# Patient Record
Sex: MALE | Race: WHITE | NOT HISPANIC OR LATINO | URBAN - METROPOLITAN AREA
[De-identification: names, ages, dates, MRNs, and addresses within clinical notes are randomized per-mention and may not be internally consistent; named-entity substitution may affect disease eponyms.]

---

## 2020-01-24 ENCOUNTER — WALK IN (OUTPATIENT)
Dept: URGENT CARE | Age: 35
End: 2020-01-24

## 2020-01-24 DIAGNOSIS — J01.90 ACUTE BACTERIAL SINUSITIS: Primary | ICD-10-CM

## 2020-01-24 DIAGNOSIS — J06.9 UPPER RESPIRATORY TRACT INFECTION, UNSPECIFIED TYPE: ICD-10-CM

## 2020-01-24 DIAGNOSIS — B96.89 ACUTE BACTERIAL SINUSITIS: Primary | ICD-10-CM

## 2020-01-24 PROCEDURE — 99204 OFFICE O/P NEW MOD 45 MIN: CPT | Performed by: NURSE PRACTITIONER

## 2020-01-24 RX ORDER — GUAIFENESIN AND DEXTROMETHORPHAN HYDROBROMIDE 1200; 60 MG/1; MG/1
1 TABLET, EXTENDED RELEASE ORAL EVERY 12 HOURS
Qty: 28 TABLET | Refills: 0 | Status: SHIPPED | OUTPATIENT
Start: 2020-01-24

## 2020-01-24 RX ORDER — AMOXICILLIN AND CLAVULANATE POTASSIUM 875; 125 MG/1; MG/1
1 TABLET, FILM COATED ORAL EVERY 12 HOURS
Qty: 20 TABLET | Refills: 0 | Status: SHIPPED | OUTPATIENT
Start: 2020-01-24

## 2020-01-24 ASSESSMENT — ENCOUNTER SYMPTOMS
PSYCHIATRIC NEGATIVE: 1
COUGH: 1
NEUROLOGICAL NEGATIVE: 1
GASTROINTESTINAL NEGATIVE: 1
CONSTITUTIONAL NEGATIVE: 1
EYES NEGATIVE: 1

## 2020-01-24 ASSESSMENT — PAIN SCALES - GENERAL: PAINLEVEL: 0

## 2021-05-26 VITALS
TEMPERATURE: 98.1 F | RESPIRATION RATE: 18 BRPM | DIASTOLIC BLOOD PRESSURE: 80 MMHG | BODY MASS INDEX: 29.82 KG/M2 | SYSTOLIC BLOOD PRESSURE: 135 MMHG | HEIGHT: 67 IN | WEIGHT: 190 LBS | HEART RATE: 76 BPM | OXYGEN SATURATION: 96 %

## 2024-07-16 ENCOUNTER — OFFICE VISIT (OUTPATIENT)
Facility: CLINIC | Age: 39
End: 2024-07-16
Payer: COMMERCIAL

## 2024-07-16 VITALS
HEIGHT: 67 IN | HEART RATE: 69 BPM | SYSTOLIC BLOOD PRESSURE: 140 MMHG | DIASTOLIC BLOOD PRESSURE: 86 MMHG | BODY MASS INDEX: 29.57 KG/M2 | WEIGHT: 188.38 LBS

## 2024-07-16 DIAGNOSIS — L60.9 NAIL DISORDER: ICD-10-CM

## 2024-07-16 DIAGNOSIS — Z00.01 ENCOUNTER FOR GENERAL ADULT MEDICAL EXAMINATION WITH ABNORMAL FINDINGS: Primary | ICD-10-CM

## 2024-07-16 DIAGNOSIS — R53.83 OTHER FATIGUE: ICD-10-CM

## 2024-07-16 DIAGNOSIS — R68.82 DECREASED LIBIDO: ICD-10-CM

## 2024-07-16 PROCEDURE — 3079F DIAST BP 80-89 MM HG: CPT | Performed by: FAMILY MEDICINE

## 2024-07-16 PROCEDURE — 99385 PREV VISIT NEW AGE 18-39: CPT | Performed by: FAMILY MEDICINE

## 2024-07-16 PROCEDURE — 3077F SYST BP >= 140 MM HG: CPT | Performed by: FAMILY MEDICINE

## 2024-07-16 PROCEDURE — 99203 OFFICE O/P NEW LOW 30 MIN: CPT | Performed by: FAMILY MEDICINE

## 2024-07-16 PROCEDURE — 3008F BODY MASS INDEX DOCD: CPT | Performed by: FAMILY MEDICINE

## 2024-07-16 NOTE — PROGRESS NOTES
Subjective:   Gordo Grewal is a 39 year old male who presents for Establish Care (Patient is here at the office to establish care. ) and Physical (Patient is requesting annual physical exam. Patient wants to referral to see a podiatrist because he has a nail problem on right big toe.)     Patient presents to [facility] [place] care and is interested in more information on TRT. Has not changed anything in lifestyle. Very active. Runs. Plays golf. Is endorsing fatigue, weight gain (20 lbs), all this within the past year. This is taking a toll on mental health. Patient is noting less sexual desire and less frequent spontaneous erections.     Right foot nail avulsion  Great toe nail fell off after marathon last year. Would like referral to podiatrist.    History/Other:    Chief Complaint Reviewed and Verified  Nursing Notes Reviewed and   Verified  Tobacco Reviewed  Allergies Reviewed  Medications Reviewed    Medical History Reviewed  Surgical History Reviewed  Family History   Reviewed  Social History Reviewed         Tobacco:  He has never smoked tobacco.    No current outpatient medications on file.         Review of Systems:  Review of Systems   Constitutional:  Positive for fatigue.   HENT: Negative.     Eyes: Negative.    Respiratory: Negative.     Cardiovascular: Negative.    Gastrointestinal: Negative.    Genitourinary:         +decreased libido   Musculoskeletal: Negative.    Skin:         +right great toe nail change   Neurological: Negative.    Psychiatric/Behavioral: Negative.           Objective:   /86 (BP Location: Left arm, Patient Position: Sitting, Cuff Size: large)   Pulse 69   Ht 5' 7\" (1.702 m)   Wt 188 lb 6 oz (85.4 kg)   BMI 29.50 kg/m²  Estimated body mass index is 29.5 kg/m² as calculated from the following:    Height as of this encounter: 5' 7\" (1.702 m).    Weight as of this encounter: 188 lb 6 oz (85.4 kg).  Physical Exam  Vitals and nursing note reviewed.   Constitutional:        General: He is not in acute distress.     Appearance: Normal appearance.   HENT:      Head: Normocephalic and atraumatic.   Eyes:      General:         Right eye: No discharge.         Left eye: No discharge.      Comments: Extraocular eye movements grossly intact   Cardiovascular:      Rate and Rhythm: Normal rate and regular rhythm.      Pulses: Normal pulses.      Heart sounds: Normal heart sounds. No murmur heard.     No friction rub. No gallop.   Pulmonary:      Effort: Pulmonary effort is normal. No respiratory distress.      Breath sounds: Normal breath sounds. No stridor. No wheezing or rhonchi.   Musculoskeletal:      Comments: Normal gait   Skin:     Findings: No rash.   Neurological:      General: No focal deficit present.      Mental Status: He is alert. Mental status is at baseline.   Psychiatric:         Mood and Affect: Mood normal.         Behavior: Behavior normal.         Assessment & Plan:   1. Encounter for general adult medical examination with abnormal findings (Primary)  -     CBC With Differential With Platelet  -     Comp Metabolic Panel (14)  -     Lipid Panel  -     Hemoglobin A1C  -     TSH W Reflex To Free T4  -     Testosterone Total    Placed orders so patient may get annual [facility] at [facility].     2. Other fatigue  4. Decreased libido  -     Testosterone Total    -ordered labs as above to ensure no anemia, thyroid, or other metabolic disorder contributing to symptoms.   -did explain that testosterone is checked 2 separate times before decision to treat is made. Counseled patient to get testosterone drawn before 10 a.m. and fasted    3. Nail disorder  -     Podiatry Referral    Placed referral to Dr. Curry given patient location     Return pending testosterone results.    Concepcion Camacho MD, 7/16/2024, 11:06 AM

## 2024-07-30 ENCOUNTER — TELEPHONE (OUTPATIENT)
Facility: CLINIC | Age: 39
End: 2024-07-30

## 2024-07-30 DIAGNOSIS — R79.89 LOW TESTOSTERONE IN MALE: Primary | ICD-10-CM

## 2024-07-30 NOTE — TELEPHONE ENCOUNTER
Testosterone low. Will repeat once more. Sent patient [employer]    Concepcion Camacho MD, 07/30/24, 8:40 AM

## 2024-08-06 LAB — TESTOSTERONE,TOTAL,MALES: 82 NG/DL (ref 250–827)

## 2024-08-26 ENCOUNTER — OFFICE VISIT (OUTPATIENT)
Facility: CLINIC | Age: 39
End: 2024-08-26
Payer: COMMERCIAL

## 2024-08-26 ENCOUNTER — TELEPHONE (OUTPATIENT)
Facility: CLINIC | Age: 39
End: 2024-08-26

## 2024-08-26 VITALS
HEART RATE: 97 BPM | DIASTOLIC BLOOD PRESSURE: 60 MMHG | WEIGHT: 193.25 LBS | SYSTOLIC BLOOD PRESSURE: 124 MMHG | BODY MASS INDEX: 30 KG/M2 | OXYGEN SATURATION: 99 %

## 2024-08-26 DIAGNOSIS — R79.89 LOW TESTOSTERONE IN MALE: Primary | ICD-10-CM

## 2024-08-26 PROCEDURE — 3074F SYST BP LT 130 MM HG: CPT | Performed by: FAMILY MEDICINE

## 2024-08-26 PROCEDURE — 3078F DIAST BP <80 MM HG: CPT | Performed by: FAMILY MEDICINE

## 2024-08-26 PROCEDURE — 99213 OFFICE O/P EST LOW 20 MIN: CPT | Performed by: FAMILY MEDICINE

## 2024-08-26 RX ORDER — TESTOSTERONE 20.25 MG/1.25G
40.5 GEL TOPICAL EVERY MORNING
Qty: 75 G | Refills: 3 | Status: SHIPPED | OUTPATIENT
Start: 2024-08-26

## 2024-08-26 NOTE — PROGRESS NOTES
Subjective:   Gordo Grewal is a 39 year old male who presents for Hypertension (Patient is at the office to follow up on blood pressure. )     Patient presents today to discuss testosterone replacement initiation. Did have low testosterone confirmed on 2 separate measurements. At previous visit patient was endorsing fatigue, weight gain (20 lbs), all this within the past year. This is taking a toll on mental health. Patient noting less sexual desire and less frequent spontaneous erections.        History/Other:    Chief Complaint Reviewed and Verified  Nursing Notes Reviewed and   Verified  Tobacco Reviewed  Allergies Reviewed  Medications Reviewed    Problem List Reviewed  Medical History Reviewed  Surgical History   Reviewed  Family History Reviewed  Social History Reviewed         Tobacco:  He has never smoked tobacco.    Current Outpatient Medications   Medication Sig Dispense Refill    Testosterone 1.62 % Transdermal Gel Place 40.5 mg onto the skin every morning. Apply to bilateral shoulders and upper arms 75 g 3         Review of Systems:  Review of Systems   Constitutional:  Positive for fatigue and unexpected weight change (weight gain).   HENT: Negative.     Eyes: Negative.    Respiratory: Negative.     Cardiovascular: Negative.    Gastrointestinal: Negative.    Genitourinary: Negative.    Musculoskeletal: Negative.    Skin: Negative.    Neurological: Negative.    Psychiatric/Behavioral: Negative.           Objective:   /60 (BP Location: Left arm, Patient Position: Sitting, Cuff Size: adult)   Pulse 97   Wt 193 lb 4 oz (87.7 kg)   SpO2 99%   BMI 30.27 kg/m²  Estimated body mass index is 30.27 kg/m² as calculated from the following:    Height as of 7/16/24: 5' 7\" (1.702 m).    Weight as of this encounter: 193 lb 4 oz (87.7 kg).  Physical Exam  Vitals and nursing note reviewed.   Constitutional:       General: He is not in acute distress.     Appearance: Normal appearance.   HENT:       Head: Normocephalic and atraumatic.   Eyes:      General:         Right eye: No discharge.         Left eye: No discharge.      Comments: Extraocular eye movements grossly intact   Pulmonary:      Effort: Pulmonary effort is normal.   Musculoskeletal:      Comments: Normal gait   Skin:     Findings: No rash.   Neurological:      General: No focal deficit present.      Mental Status: He is alert. Mental status is at baseline.   Psychiatric:         Mood and Affect: Mood normal.         Behavior: Behavior normal.           Assessment & Plan:   1. Low testosterone in male (Primary)  -     Testosterone; Place 40.5 mg onto the skin every morning. Apply to bilateral shoulders and upper arms  Dispense: 75 g; Refill: 3  The patient educated on disease process, medication use and side effects, and when to have PCP follow up.       Return in about 2-3 months (around 10/26/2024) for For testosterone follow up.    Concepcion Camacho MD, 8/26/2024, 8:48 AM

## 2024-08-27 DIAGNOSIS — R79.89 LOW TESTOSTERONE IN MALE: ICD-10-CM

## 2024-08-27 RX ORDER — TESTOSTERONE 20.25 MG/1.25G
40.5 GEL TOPICAL EVERY MORNING
Qty: 75 G | Refills: 3 | Status: SHIPPED | OUTPATIENT
Start: 2024-08-27

## 2024-09-06 ENCOUNTER — TELEPHONE (OUTPATIENT)
Facility: CLINIC | Age: 39
End: 2024-09-06

## 2024-09-06 DIAGNOSIS — R79.89 LOW TESTOSTERONE IN MALE: ICD-10-CM

## 2024-09-06 NOTE — TELEPHONE ENCOUNTER
Triage support, please assist with prior authorization for testosterone 1.62% transdermal gel. Thank you.    Spoke to patient, full name and date of birth verified.   Patient was told that [facility] needs an authorization for the testosterone gel that was prescribed 8/27/24.     RN attempted to re-send the prescription, but RN is not authorized for this medication.     Patient requests call back when completed.    RN called [facility], spoke with pharmacist Kuldip, they have the prescription, he tried to run it again, but it requires prior authorization.

## 2024-09-09 NOTE — TELEPHONE ENCOUNTER
Payer: [facility]    [phone]     [facility] to [facility].

## 2025-01-16 DIAGNOSIS — R79.89 LOW TESTOSTERONE IN MALE: ICD-10-CM

## 2025-01-20 RX ORDER — TESTOSTERONE 1.62 MG/G
GEL TRANSDERMAL
Qty: 75 G | Refills: 0 | Status: SHIPPED | OUTPATIENT
Start: 2025-01-20

## 2025-01-20 NOTE — TELEPHONE ENCOUNTER
Please review; protocol failed/ has no protocol    Anayeli Gallegos minutes ago (1:38 PM)     TA  Incoming call from patient following up on this refill. Patient states he will be out of the medication tomorrow and would like a refill as soon as possible.     Please assist      Recent fills: 12/23/2024,11/21/2024,10/24/2024  Last Rx written: 08/27/2024  Last Office Visit: 08/26/2024    Recent Visits  Date Type Provider Dept   08/26/24 Office Visit Concepcion Camacho MD [facility]           Requested Prescriptions   Pending Prescriptions Disp Refills    TESTOSTERONE 20.25 MG/ACT (1.62%) Transdermal Gel [Pharmacy Med Name: Testosterone 1.62 % Gel Nort] 75 g 0     Sig: PLACE 2 PUMPS (40.5 MG) ONTO THE SKIN EVERY MORNING. APPLY TO BOTH SHOULDERS AND UPPER ARMS.       Controlled Substance Medication Failed - 1/20/2025  1:43 PM        Failed - This medication is a controlled substance - forward to provider to refill        Passed - Medication is active on med list           Recent Outpatient Visits              4 months ago Low testosterone in male    [facility] Concepcion Kunz MD    Office Visit    6 months ago Encounter for general adult medical examination with abnormal findings    [facility] Concepcion Kunz MD    Office Visit

## 2025-01-20 NOTE — TELEPHONE ENCOUNTER
Incoming call from patient following up on this refill. Patient states he will be out of the medication tomorrow and would like a refill as soon as possible.    Please assist

## 2025-02-25 DIAGNOSIS — R79.89 LOW TESTOSTERONE IN MALE: ICD-10-CM

## 2025-02-28 RX ORDER — TESTOSTERONE 1.62 MG/G
GEL TRANSDERMAL
Qty: 75 G | Refills: 0 | Status: SHIPPED | OUTPATIENT
Start: 2025-02-28

## 2025-02-28 NOTE — TELEPHONE ENCOUNTER
Please kindly review this medication    [] FAILS PROTOCOL    [] HAS NO PROTOCOL ATTACHED    Recent fill dates: 1/24/25, 12/23/24, and 11/21/24  Date of  last written prescription: 1/20/25   Last written quantity: #75 g and processed as a 30 day supply  Date patient is due for a refill: Now (2/20/2025)  [] Takes as needed  [x] Takes scheduled daily     TESTOSTERONE TOTAL   Component  Ref Range & Units 8/5/24  9:12 AM   TESTOSTERONE,TOTAL,MALES  250 - 827 ng/dL 82 Low

## 2025-03-02 DIAGNOSIS — R79.89 LOW TESTOSTERONE IN MALE: ICD-10-CM

## 2025-03-05 RX ORDER — TESTOSTERONE 1.62 MG/G
GEL TRANSDERMAL
Qty: 75 G | Refills: 0 | OUTPATIENT
Start: 2025-03-05

## 2025-04-04 DIAGNOSIS — R79.89 LOW TESTOSTERONE IN MALE: ICD-10-CM

## 2025-04-08 RX ORDER — TESTOSTERONE 1.62 MG/G
GEL TRANSDERMAL
Qty: 75 G | Refills: 0 | Status: SHIPPED | OUTPATIENT
Start: 2025-04-08

## 2025-04-08 NOTE — TELEPHONE ENCOUNTER
Please review.  Protocol failed/has no protocol.    Recent fills each # 75 gm : 02/28/2025,01/24/2025,12/23/2024  Last prescription written: 02/28/2025  Last office visit:  8/26/2024    Requested Prescriptions     Pending Prescriptions Disp Refills    TESTOSTERONE 20.25 MG/ACT (1.62%) Transdermal Gel [Pharmacy Med Name: Testosterone 1.62 % Gel Nort] 75 g 0     Sig: PLACE 2 PUMPS (40.5 MG) ONTO THE SKIN EVERY MORNING. APPLY TO BOTH SHOULDERS AND UPPER ARMS.

## 2025-05-15 DIAGNOSIS — R79.89 LOW TESTOSTERONE IN MALE: ICD-10-CM

## 2025-05-15 RX ORDER — TESTOSTERONE 1.62 MG/G
GEL TRANSDERMAL
Qty: 75 G | Refills: 3 | Status: SHIPPED | OUTPATIENT
Start: 2025-05-15

## 2025-05-15 NOTE — TELEPHONE ENCOUNTER
04/08/2025  LAST WRITTEN: 04/08/2025  Quantity: 75    Recent Visits  Date Type Provider Dept   08/26/24 Office Visit Concepcion Camacho MD [facility]